# Patient Record
Sex: FEMALE
[De-identification: names, ages, dates, MRNs, and addresses within clinical notes are randomized per-mention and may not be internally consistent; named-entity substitution may affect disease eponyms.]

---

## 2022-01-26 ENCOUNTER — NON-APPOINTMENT (OUTPATIENT)
Age: 82
End: 2022-01-26

## 2022-01-26 ENCOUNTER — APPOINTMENT (OUTPATIENT)
Dept: INTERNAL MEDICINE | Facility: CLINIC | Age: 82
End: 2022-01-26
Payer: MEDICARE

## 2022-01-26 ENCOUNTER — CLINICAL ADVICE (OUTPATIENT)
Age: 82
End: 2022-01-26

## 2022-01-26 VITALS
OXYGEN SATURATION: 96 % | WEIGHT: 112 LBS | DIASTOLIC BLOOD PRESSURE: 80 MMHG | BODY MASS INDEX: 25.92 KG/M2 | HEART RATE: 62 BPM | HEIGHT: 55 IN | SYSTOLIC BLOOD PRESSURE: 144 MMHG

## 2022-01-26 DIAGNOSIS — Z78.9 OTHER SPECIFIED HEALTH STATUS: ICD-10-CM

## 2022-01-26 LAB
BILIRUB UR QL STRIP: NEGATIVE
CLARITY UR: CLEAR
COLLECTION METHOD: NORMAL
GLUCOSE UR-MCNC: NEGATIVE
HCG UR QL: 0.2 EU/DL
HGB UR QL STRIP.AUTO: NORMAL
KETONES UR-MCNC: NEGATIVE
LEUKOCYTE ESTERASE UR QL STRIP: NEGATIVE
NITRITE UR QL STRIP: NEGATIVE
PH UR STRIP: 5.5
PROT UR STRIP-MCNC: NEGATIVE
SP GR UR STRIP: 1.02

## 2022-01-26 PROCEDURE — 81003 URINALYSIS AUTO W/O SCOPE: CPT | Mod: QW

## 2022-01-26 PROCEDURE — 36415 COLL VENOUS BLD VENIPUNCTURE: CPT

## 2022-01-26 PROCEDURE — 93000 ELECTROCARDIOGRAM COMPLETE: CPT

## 2022-01-26 PROCEDURE — G0439: CPT

## 2022-01-26 NOTE — ASSESSMENT
[FreeTextEntry1] : 82 year old female presents for initial annual exam.\par \par Vascular dementia\par -Fall precautions\par -Aspiration precautions\par -Continue meds as directed\par -Neuro consult\par -Advised to touch base with elderly \par \par h/o CVA\par -cont aspirin, statin \par \par Abnormal EKG, no chest pain.  likely nstemi in past?\par -cont aspirin, statin\par -cardio consult \par \par Annual \par -Advise to get yearly Flu shot -up to date \par -Advise Yearly Eye exam with Ophthalmologist\par -Advise Yearly Dental exam\par -Educated of the importance of Healthy diet, such as Mediterranean Diet and Exercise, such as walking >20 minutes a day and increasing gradually as tolerated\par \par f/u in 3 months \par \par \par \par \par \par

## 2022-01-26 NOTE — HEALTH RISK ASSESSMENT
[Good] : ~his/her~  mood as  good [Never] : Never [No] : No [0] : 2) Feeling down, depressed, or hopeless: Not at all (0) [] :  [Independent] : feeding [Some assistance needed] : walking [[id number]] : 0 [Change in mental status noted] : No change in mental status noted

## 2022-01-26 NOTE — PHYSICAL EXAM
[No Acute Distress] : no acute distress R/O Malignancy [Normal] : normal rate, regular rhythm, normal S1 and S2 and no murmur heard [Pedal Pulses Present] : the pedal pulses are present [No Edema] : there was no peripheral edema [Soft] : abdomen soft [Non Tender] : non-tender [Non-distended] : non-distended [Normal Posterior Cervical Nodes] : no posterior cervical lymphadenopathy [Normal Anterior Cervical Nodes] : no anterior cervical lymphadenopathy [No CVA Tenderness] : no CVA  tenderness [No Rash] : no rash [Normal Affect] : the affect was normal [de-identified] : moderate cognitive impairment

## 2022-01-26 NOTE — HISTORY OF PRESENT ILLNESS
[de-identified] : 82 year old female presents for initial annual exam.  Accompanied by daughter who is also acting as \par Recently moved to [place] and is living with daughter, previously was living in [place] for the last 50 years\par \par h/o CVA, unsteady gait, vascular dementia, \par has been slowly declining over the last several years.  Occasionally gets agitated but mostly pleasant\par Normal p.o. intake\par no home care established.  \par \par Answer simple commands, questions \par \par \par \par \par \par \par \par \par \par \par \par \par

## 2022-01-27 ENCOUNTER — CLINICAL ADVICE (OUTPATIENT)
Age: 82
End: 2022-01-27

## 2022-01-27 LAB
25(OH)D3 SERPL-MCNC: 42.3 NG/ML
ALBUMIN SERPL ELPH-MCNC: 4.3 G/DL
ALP BLD-CCNC: 108 U/L
ALT SERPL-CCNC: 19 U/L
ANION GAP SERPL CALC-SCNC: 13 MMOL/L
APPEARANCE: CLEAR
AST SERPL-CCNC: 23 U/L
BACTERIA: NEGATIVE
BASOPHILS # BLD AUTO: 0.01 K/UL
BASOPHILS NFR BLD AUTO: 0.1 %
BILIRUB DIRECT SERPL-MCNC: 0.2 MG/DL
BILIRUB INDIRECT SERPL-MCNC: 0.4 MG/DL
BILIRUB SERPL-MCNC: 0.5 MG/DL
BILIRUBIN URINE: NEGATIVE
BLOOD URINE: NEGATIVE
BUN SERPL-MCNC: 21 MG/DL
CALCIUM SERPL-MCNC: 9 MG/DL
CHLORIDE SERPL-SCNC: 106 MMOL/L
CHOLEST SERPL-MCNC: 135 MG/DL
CO2 SERPL-SCNC: 26 MMOL/L
COLOR: NORMAL
CREAT SERPL-MCNC: 0.71 MG/DL
EOSINOPHIL # BLD AUTO: 0.04 K/UL
EOSINOPHIL NFR BLD AUTO: 0.5 %
ESTIMATED AVERAGE GLUCOSE: 103 MG/DL
FERRITIN SERPL-MCNC: 82 NG/ML
FOLATE SERPL-MCNC: 7.5 NG/ML
GLUCOSE QUALITATIVE U: NEGATIVE
GLUCOSE SERPL-MCNC: 97 MG/DL
HBA1C MFR BLD HPLC: 5.2 %
HCT VFR BLD CALC: 41.5 %
HDLC SERPL-MCNC: 50 MG/DL
HGB BLD-MCNC: 13.1 G/DL
HYALINE CASTS: 4 /LPF
IMM GRANULOCYTES NFR BLD AUTO: 0.2 %
IRON SATN MFR SERPL: 37 %
IRON SERPL-MCNC: 88 UG/DL
KETONES URINE: NEGATIVE
LDLC SERPL CALC-MCNC: 33 MG/DL
LEUKOCYTE ESTERASE URINE: NEGATIVE
LYMPHOCYTES # BLD AUTO: 2.11 K/UL
LYMPHOCYTES NFR BLD AUTO: 25.2 %
MAN DIFF?: NORMAL
MCHC RBC-ENTMCNC: 31 PG
MCHC RBC-ENTMCNC: 31.6 GM/DL
MCV RBC AUTO: 98.3 FL
MICROSCOPIC-UA: NORMAL
MONOCYTES # BLD AUTO: 0.53 K/UL
MONOCYTES NFR BLD AUTO: 6.3 %
NEUTROPHILS # BLD AUTO: 5.67 K/UL
NEUTROPHILS NFR BLD AUTO: 67.7 %
NITRITE URINE: NEGATIVE
NONHDLC SERPL-MCNC: 85 MG/DL
PH URINE: 6
PLATELET # BLD AUTO: 199 K/UL
POTASSIUM SERPL-SCNC: 4.1 MMOL/L
PROT SERPL-MCNC: 6.6 G/DL
PROTEIN URINE: NORMAL
RBC # BLD: 4.22 M/UL
RBC # FLD: 13.3 %
RED BLOOD CELLS URINE: 2 /HPF
SODIUM SERPL-SCNC: 145 MMOL/L
SPECIFIC GRAVITY URINE: 1.03
SQUAMOUS EPITHELIAL CELLS: 4 /HPF
T3FREE SERPL-MCNC: 2.39 PG/ML
T4 FREE SERPL-MCNC: 1.7 NG/DL
TIBC SERPL-MCNC: 241 UG/DL
TRIGL SERPL-MCNC: 261 MG/DL
TSH SERPL-ACNC: 0.2 UIU/ML
UIBC SERPL-MCNC: 152 UG/DL
UROBILINOGEN URINE: NORMAL
VIT B12 SERPL-MCNC: 977 PG/ML
WBC # FLD AUTO: 8.38 K/UL
WHITE BLOOD CELLS URINE: 4 /HPF

## 2022-03-09 DIAGNOSIS — Z82.3 FAMILY HISTORY OF STROKE: ICD-10-CM

## 2022-03-09 RX ORDER — DRONABINOL 2.5 MG/1
2.5 CAPSULE ORAL DAILY
Refills: 0 | Status: ACTIVE | COMMUNITY

## 2022-03-16 ENCOUNTER — APPOINTMENT (OUTPATIENT)
Dept: CARDIOLOGY | Facility: CLINIC | Age: 82
End: 2022-03-16
Payer: MEDICARE

## 2022-03-16 VITALS
DIASTOLIC BLOOD PRESSURE: 97 MMHG | SYSTOLIC BLOOD PRESSURE: 165 MMHG | WEIGHT: 112 LBS | HEART RATE: 61 BPM | HEIGHT: 57 IN | TEMPERATURE: 97.3 F | BODY MASS INDEX: 24.16 KG/M2 | OXYGEN SATURATION: 96 %

## 2022-03-16 PROCEDURE — 99204 OFFICE O/P NEW MOD 45 MIN: CPT

## 2022-03-16 PROCEDURE — 93000 ELECTROCARDIOGRAM COMPLETE: CPT

## 2022-03-29 ENCOUNTER — APPOINTMENT (OUTPATIENT)
Dept: NEUROLOGY | Facility: CLINIC | Age: 82
End: 2022-03-29
Payer: MEDICARE

## 2022-03-29 VITALS — BODY MASS INDEX: 24.81 KG/M2 | WEIGHT: 115 LBS | HEIGHT: 57 IN

## 2022-03-29 VITALS
WEIGHT: 115 LBS | HEIGHT: 57 IN | BODY MASS INDEX: 24.81 KG/M2 | SYSTOLIC BLOOD PRESSURE: 163 MMHG | HEART RATE: 61 BPM | DIASTOLIC BLOOD PRESSURE: 81 MMHG

## 2022-03-29 PROCEDURE — 99205 OFFICE O/P NEW HI 60 MIN: CPT

## 2022-03-29 NOTE — HISTORY OF PRESENT ILLNESS
[FreeTextEntry1] : HPI (initial visit Mar 29, 2022)- 82 year old female w/ hx of CVA, HTN, HLD, diagnosed with vascular dementia, is referred to neurology to establish neurological care. \par \par She recently moved to [place], from [place] (where she lived for 50 years), to live with daughter. \par \par She speaks Turkmen, and is accompanied by  and daughter. Daughter helps with translation.\par \par Daughter reports she had a stroke in 2019 (left sided weakness, slurred speech) and she went to rehab. No memory concerns prior to stroke. There was a gradual decline in memory after stroke, most noticeable in early 2020. She woke up one morning and was confused, did not know where she was. There were days she was fine. "I think she has gotten through several step declines".She has had a jumpy decline, now stable. She recently started having some behavioral disturbances, started a few months ago and was a 2 day incident, she was seeing and hearing things and wanted to leave the house. Per , "per  is gone". She recognizes faces and close family and friends. She no longer converses much, but when she does, it does not make any sense.  adds that she talks to people in the night times. "she sleeps all day". She is able to feed herself, but needs assistance with dressing/bathing. \par \par SHe is on both donepezil 10 mg QD and memantine 10 mg BID- no necessary improvement noticed on these medications or worsening confusion. She is also on aspirin therapy. She is not on anticoagulation. No hx of bradycardia. \par \par Daughter adds that she has gotten slower since her stroke and shuffles more, however she believes she may have shuffled in the past as well.  also notices a mild tremor of hands.\par NO known dementia in family. Sister has hx of strokes and aneurysm. \par \par Labs reviewed from 1/2022:-\par B12 and folate normal\par Ferritin normal. LFT normal. Iron and TBC normal. \par TSH 0.20 [L], Hba1c normal. Vitamin D normal (42.3)

## 2022-03-29 NOTE — PHYSICAL EXAM
[FreeTextEntry1] : PHYSICAL EXAM\par Constitutional: Alert, no acute distress \par Neck: Full range of motion\par Psychiatric: appropriate affect and mood\par Pulmonary: No respiratory distress, stable on room air\par \par NEUROLOGICAL EXAM\par Mental status: Oriented to name, country, state, and month. \par                         MMSE: 10/30 (oriented to country, state, month, word registration intact, naming intact, repetition intact, can follow 2 step commands, follows written commands)\par Speech/language: No dysarthria. Intact naming of objects (pen, watch). Comprehension intact. \par Cranial nerves:\par CN II:  Pupil size equal and briskly reactive to light. \par CN III, IV, VI: EOMI, no nystagmus, no ptosis. \par CN V: Facial sensation is intact to pinprick in all 3 divisions bilaterally.\par CN VII: Face is symmetric with normal eye closure and smile.\par CN VII: Hearing is normal to rubbing fingers\par CN IX, X: Palate elevates symmetrically. Phonation is normal.\par CN XI: Head turning and shoulder shrug are intact\par CN XII: Tongue is midline with normal movements and no atrophy.\par Motor: There is no pronator drift of out-stretched arms. + cogwheel rigidity at right wrist. Proximal strength UE and LE 4+/5 and distally 5/5. \par 5/5 muscle power at bilat: Deltoid, Biceps, Triceps, Wrist ext, Finger abd, Hip flex, Hip ext, Knee flex, Knee ext, Ankle flex, Ankle ext\par Reflexes: Reflexes are 2+ and symmetric at the biceps and knees. Plantar responses are flexor.\par Sensory: Intact sensations to light touch in upper and lower extremities\par Coordination: Mild action tremor (R>L). No bradykinesia.\par Gait/Stance: Stooped posture. Shuffling gait with decreased arm swing and turn en bloc.\par \par \par

## 2022-03-29 NOTE — ASSESSMENT
[FreeTextEntry1] : Assessment/Plan:\par  82 year old right handed Mongolian speaking female w/ hx of cognitive decline since her stroke in 2019. She has had a steady stepwise decline in her memory and lately has had some mild behavioral disturbances. She was given the diagnosis of vascular dementia at [place] and is on donepezil and memantine, and it is unclear if she has had any clinical improvement on these therapies. On neuro exam, she exhibits some parkinsonian features and scored a 10/30 on MMSE. 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 However, cannot completely rule out a mixed dementia presentation (vascular + neurodegenerative, i.e, Alzheimers vs Lewy Body dementia)\par \par Plan:-\par [] Requested family obtain records from [place] (particular neurology records including imaging)\par [] Will continue on donepezil and memantine for now. Explain to family that these two therapies are indicated for Alzheimers dementia and do not necessarily have any role in vascular dementia. Will review records from [place] and could consider tapering off at future visit. \par [] Counselled on importance of management of HTN and HLD and compliance with aspirin therapy, to prevent continued progression of vascular dementia. \par \par Return to clinic 3 months\par \par Tati Lombardi M.D\par

## 2022-07-05 ENCOUNTER — NON-APPOINTMENT (OUTPATIENT)
Age: 82
End: 2022-07-05

## 2022-07-07 LAB
APPEARANCE: CLEAR
BILIRUBIN URINE: NEGATIVE
BLOOD URINE: NEGATIVE
COLOR: NORMAL
GLUCOSE QUALITATIVE U: NEGATIVE
KETONES URINE: NEGATIVE
LEUKOCYTE ESTERASE URINE: NEGATIVE
NITRITE URINE: NEGATIVE
PH URINE: 6
PROTEIN URINE: NORMAL
SPECIFIC GRAVITY URINE: 1.02
UROBILINOGEN URINE: NORMAL

## 2022-07-12 ENCOUNTER — APPOINTMENT (OUTPATIENT)
Dept: INTERNAL MEDICINE | Facility: CLINIC | Age: 82
End: 2022-07-12

## 2022-07-12 VITALS
SYSTOLIC BLOOD PRESSURE: 173 MMHG | OXYGEN SATURATION: 95 % | BODY MASS INDEX: 25.03 KG/M2 | HEART RATE: 60 BPM | DIASTOLIC BLOOD PRESSURE: 83 MMHG | HEIGHT: 57 IN | WEIGHT: 116 LBS

## 2022-07-12 PROCEDURE — 99214 OFFICE O/P EST MOD 30 MIN: CPT | Mod: 25

## 2022-07-12 PROCEDURE — 36415 COLL VENOUS BLD VENIPUNCTURE: CPT

## 2022-07-12 NOTE — PHYSICAL EXAM
[No Acute Distress] : no acute distress [Normal] : normal rate, regular rhythm, normal S1 and S2 and no murmur heard [Pedal Pulses Present] : the pedal pulses are present [No Edema] : there was no peripheral edema [Soft] : abdomen soft [Non Tender] : non-tender [Non-distended] : non-distended [de-identified] : moderate cognitive impairment.  follows simple commands

## 2022-07-12 NOTE — HISTORY OF PRESENT ILLNESS
[de-identified] : 82 year old female h/o CVA, unsteady gait, vascular dementia,  presents for follow-up.  \par Recently moved to [place] and is living with daughter, previously was living in [place] for the last 50 years\par She speaks Arabic, and is accompanied by  and daughter. Daughter helps with translation.\par \par Recently seen by neurologist regarding her vascular dementia, agitation started on Seroquel by neurologist\par has been slowly declining over the last several years. \par Normal p.o. intake\par no home care established.  Daughter states waiting for her house to be reevaluated as currently living in an apartment.  In the process of getting Medicaid\par \par Answer simple commands, questions

## 2022-07-12 NOTE — ASSESSMENT
[FreeTextEntry1] : 82 year old female h/o CVA, unsteady gait, vascular dementia,  presents for follow-up.  \par \par Vascular dementia with some behavioral disturbance \par -seroquel qhs and prn\par -Fall precautions\par -Aspiration precautions\par -Continue articept, namenda as directed\par -Neuro consult appreciated, \par \par h/o CVA\par -cont aspirin, statin \par \par Abnormal EKG, no chest pain.  likely nstemi in past?\par -cont aspirin, statin\par -cardio consult appreciated, no further intervention \par \par \par f/u in 3 months \par \par Please note that 36 minutes time spent in care with this patient which includes pre-visit preparation such as reviewed pertinent labs, imaging, and specialists consultation, in-person visit, post-visit documentation and discussion.\par \par \par \par \par \par \par

## 2022-07-13 LAB
ALBUMIN SERPL ELPH-MCNC: 4.2 G/DL
ALP BLD-CCNC: 128 U/L
ALT SERPL-CCNC: 17 U/L
ANION GAP SERPL CALC-SCNC: 11 MMOL/L
AST SERPL-CCNC: 20 U/L
BASOPHILS # BLD AUTO: 0.01 K/UL
BASOPHILS NFR BLD AUTO: 0.1 %
BILIRUB DIRECT SERPL-MCNC: 0.1 MG/DL
BILIRUB INDIRECT SERPL-MCNC: 0.3 MG/DL
BILIRUB SERPL-MCNC: 0.4 MG/DL
BUN SERPL-MCNC: 14 MG/DL
CALCIUM SERPL-MCNC: 9 MG/DL
CHLORIDE SERPL-SCNC: 108 MMOL/L
CHOLEST SERPL-MCNC: 135 MG/DL
CO2 SERPL-SCNC: 27 MMOL/L
CREAT SERPL-MCNC: 0.7 MG/DL
EGFR: 86 ML/MIN/1.73M2
EOSINOPHIL # BLD AUTO: 0.11 K/UL
EOSINOPHIL NFR BLD AUTO: 1.3 %
ESTIMATED AVERAGE GLUCOSE: 105 MG/DL
GLUCOSE SERPL-MCNC: 106 MG/DL
HBA1C MFR BLD HPLC: 5.3 %
HCT VFR BLD CALC: 39.4 %
HDLC SERPL-MCNC: 55 MG/DL
HGB BLD-MCNC: 13.1 G/DL
IMM GRANULOCYTES NFR BLD AUTO: 0.1 %
LDLC SERPL CALC-MCNC: 27 MG/DL
LYMPHOCYTES # BLD AUTO: 1.93 K/UL
LYMPHOCYTES NFR BLD AUTO: 22.4 %
MAN DIFF?: NORMAL
MCHC RBC-ENTMCNC: 31.8 PG
MCHC RBC-ENTMCNC: 33.2 GM/DL
MCV RBC AUTO: 95.6 FL
MONOCYTES # BLD AUTO: 0.49 K/UL
MONOCYTES NFR BLD AUTO: 5.7 %
NEUTROPHILS # BLD AUTO: 6.07 K/UL
NEUTROPHILS NFR BLD AUTO: 70.4 %
NONHDLC SERPL-MCNC: 80 MG/DL
PLATELET # BLD AUTO: 199 K/UL
POTASSIUM SERPL-SCNC: 3.9 MMOL/L
PROT SERPL-MCNC: 6.2 G/DL
RBC # BLD: 4.12 M/UL
RBC # FLD: 13 %
SODIUM SERPL-SCNC: 146 MMOL/L
T3FREE SERPL-MCNC: 2.67 PG/ML
T4 FREE SERPL-MCNC: 1.7 NG/DL
TRIGL SERPL-MCNC: 264 MG/DL
TSH SERPL-ACNC: 0.05 UIU/ML
WBC # FLD AUTO: 8.62 K/UL

## 2022-09-21 ENCOUNTER — APPOINTMENT (OUTPATIENT)
Dept: CARDIOLOGY | Facility: CLINIC | Age: 82
End: 2022-09-21

## 2022-09-26 ENCOUNTER — NON-APPOINTMENT (OUTPATIENT)
Age: 82
End: 2022-09-26

## 2022-10-10 ENCOUNTER — APPOINTMENT (OUTPATIENT)
Dept: NEUROLOGY | Facility: CLINIC | Age: 82
End: 2022-10-10

## 2022-10-10 VITALS
SYSTOLIC BLOOD PRESSURE: 173 MMHG | WEIGHT: 111 LBS | HEIGHT: 57 IN | BODY MASS INDEX: 23.95 KG/M2 | DIASTOLIC BLOOD PRESSURE: 89 MMHG | HEART RATE: 65 BPM

## 2022-10-10 PROCEDURE — 99214 OFFICE O/P EST MOD 30 MIN: CPT

## 2022-10-10 RX ORDER — SERTRALINE 25 MG/1
TABLET, FILM COATED ORAL
Refills: 0 | Status: ACTIVE | COMMUNITY

## 2022-10-10 RX ORDER — QUETIAPINE FUMARATE 25 MG/1
25 TABLET ORAL
Qty: 30 | Refills: 0 | Status: DISCONTINUED | COMMUNITY
Start: 2022-07-05 | End: 2022-10-10

## 2022-10-10 NOTE — HISTORY OF PRESENT ILLNESS
[FreeTextEntry1] : INTERIM HX 10/10/2022: Family brought in CTA head 3/2021 disc and report. She is accompanied by daughter and . She has been refusing to take meds. Keeps pills in her mouth. Walking is getting worse, posture worse. Sleeps a lot more. Talks to herself in the night. More bladder accidents, wearing depends.  Still recognizes family, can have a blank stare at times. Occasional says a few words, or respond to questions. She said, "Hi" to me when I walked in, daughter was surprised about this. This is inconsistent. Speech is mostly nonsensical . She cannot dress herself. Needs assistance with feeding. No falls. Used to have PT 2x/week. She has had constipation since she was younger. She has yelled out in her sleep, even before her stroke. \par ----------------------------------------------\par HPI (initial visit Mar 29, 2022)- 82 year old right sided female w/ hx of CVA, HTN, HLD, diagnosed with vascular dementia, is referred to neurology to establish neurological care. \par \par She recently moved to [place], from [place] (where she lived for 50 years), to live with daughter Summer). \par \par She speaks Albanian, and is accompanied by  and daughter. Daughter helps with translation.\par \par Daughter reports she had a stroke in 2017 (left sided weakness, slurred speech) and she went to rehab. No memory concerns prior to stroke. There was a gradual decline in memory after stroke, most noticeable in early 2020. She woke up one morning and was confused, did not know where she was. There were days she was fine. "I think she has gotten through several step declines".She has had a jumpy decline, now stable. She recently started having some behavioral disturbances, started a few months ago and was a 2 day incident, she was seeing and hearing things and wanted to leave the house. Per , "per  is gone". She recognizes faces and close family and friends. She no longer converses much, but when she does, it does not make any sense.  adds that she talks to people in the night times. "she sleeps all day". She is able to feed herself, but needs assistance with dressing/bathing. \par \par SHe is on both donepezil 10 mg QD and memantine 10 mg BID- no necessary improvement noticed on these medications or worsening confusion. She is also on aspirin therapy. She is not on anticoagulation. No hx of bradycardia. \par \par Daughter adds that she has gotten slower since her stroke and shuffles more, however she believes she may have shuffled in the past as well.  also notices a mild tremor of hands.\par NO known dementia in family. Sister has hx of strokes and aneurysm. \par \par Labs reviewed from 1/2022:-\par B12 and folate normal\par Ferritin normal. LFT normal. Iron and TBC normal. \par TSH 0.20 [L], Hba1c normal. Vitamin D normal (42.3)

## 2022-10-10 NOTE — ASSESSMENT
[FreeTextEntry1] : Assessment/Plan:\par  82 year old right handed Maori speaking female w/ hx of cognitive decline since her stroke in 2017. She has had a steady stepwise decline in her memory and lately has had some mild behavioral and gait disturbances. She was given the diagnosis of vascular dementia at [place] and is on donepezil and memantine, and it is unclear if she has had any clinical improvement on these therapies. On neuro exam, she exhibits parkinsonian features and scored a 10/30 on MMSE (3/2022). 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 At the same time, cannot completely rule out a mixed dementia presentation (vascular + neurodegenerative, i.e, Alzheimers vs Lewy Body dementia)\par \par Dementia stage is at least moderate. \par \par Plan:-\par [] Requested family obtain records from [place] (particular neurology records including imaging)- MRI brain\par [] CTA head to follow up on Aneurysm\par [] Will refer to movement disorder for parkinsonism and to see if there is any indication to trial levodopa\par [] Will continue on donepezil and memantine for now. Explained to family that these two therapies are indicated for Alzheimers dementia and do not necessarily have any role in vascular dementia. Will review records from [place] and could consider tapering off at future visit. \par [] Counselled on importance of management of HTN and HLD and compliance with aspirin therapy, to prevent continued progression of vascular dementia. \par [] Fall precautions\par \par \par Return to clinic 6 months\par \par Tati Lombardi M.D\par

## 2022-10-10 NOTE — DATA REVIEWED
[de-identified] : CTA head 3/23/2021 completed in [place]- "high grade stenosis of the proximal right MCA without occlusion. 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 Saccular right M2 MCA aneurysm, measuring up to 4 cm. No evidence of acute intracranial pathology". "Moderate supratentorial parenchymal volume loss and associated ventriculomegaly". "moderate chronic microvascular ischemic changes"

## 2022-10-10 NOTE — PHYSICAL EXAM
[FreeTextEntry1] : (she is Italian speaking, can understand a little English)\par Oriented to name, country (not state).  Not date/month/year. \par she recognizes family and named them\par + Myerson sign and palmomental sign\par Immediate recall 2/3 \par Delayed recall 0/3\par No dysarthria. Intact naming of objects (pen, necklace). Able to follow simple commands and 3 step commands. Intact repetition in Italian. \par Pupil size equal and briskly reactive to light. \par EOMI, no nystagmus, no ptosis. \par Facial sensation is intact to pinprick in all 3 divisions bilaterally.\par There is no pronator drift of out-stretched arms. + cogwheel rigidity at right wrist. Proximal strength UE and LE 4+/5 and distally 5/5. \par Reflexes are 2+ and symmetric at the biceps and knees. Plantar responses are flexor.\par Intact sensations to light touch in upper and lower extremities\par Mild action tremor (R>L). No bradykinesia. Cogwheel rigidity at wrist\par Stooped posture. Shuffling gait with decreased arm swing and turn en bloc.\par \par \par

## 2022-10-17 ENCOUNTER — APPOINTMENT (OUTPATIENT)
Dept: CARDIOLOGY | Facility: CLINIC | Age: 82
End: 2022-10-17

## 2022-10-17 VITALS
HEART RATE: 59 BPM | SYSTOLIC BLOOD PRESSURE: 134 MMHG | DIASTOLIC BLOOD PRESSURE: 77 MMHG | HEIGHT: 57 IN | TEMPERATURE: 96.8 F | BODY MASS INDEX: 23.95 KG/M2 | WEIGHT: 111 LBS | OXYGEN SATURATION: 96 %

## 2022-10-17 DIAGNOSIS — R94.31 ABNORMAL ELECTROCARDIOGRAM [ECG] [EKG]: ICD-10-CM

## 2022-10-17 DIAGNOSIS — Z13.6 ENCOUNTER FOR SCREENING FOR CARDIOVASCULAR DISORDERS: ICD-10-CM

## 2022-10-17 PROCEDURE — 99214 OFFICE O/P EST MOD 30 MIN: CPT

## 2022-10-21 PROBLEM — Z13.6 SCREENING FOR CARDIOVASCULAR CONDITION: Status: ACTIVE | Noted: 2022-03-20

## 2022-10-21 PROBLEM — R94.31 ABNORMAL EKG: Status: ACTIVE | Noted: 2022-01-26

## 2022-10-27 ENCOUNTER — APPOINTMENT (OUTPATIENT)
Dept: CT IMAGING | Facility: CLINIC | Age: 82
End: 2022-10-27

## 2022-10-27 ENCOUNTER — OUTPATIENT (OUTPATIENT)
Dept: OUTPATIENT SERVICES | Facility: HOSPITAL | Age: 82
LOS: 1 days | End: 2022-10-27
Payer: MEDICARE

## 2022-10-27 DIAGNOSIS — I67.1 CEREBRAL ANEURYSM, NONRUPTURED: ICD-10-CM

## 2022-10-27 PROCEDURE — 70496 CT ANGIOGRAPHY HEAD: CPT | Mod: 26,MH

## 2022-10-27 PROCEDURE — 70496 CT ANGIOGRAPHY HEAD: CPT

## 2022-10-31 ENCOUNTER — NON-APPOINTMENT (OUTPATIENT)
Age: 82
End: 2022-10-31

## 2022-11-10 ENCOUNTER — RX RENEWAL (OUTPATIENT)
Age: 82
End: 2022-11-10

## 2022-11-15 ENCOUNTER — APPOINTMENT (OUTPATIENT)
Dept: NEUROLOGY | Facility: CLINIC | Age: 82
End: 2022-11-15

## 2022-11-15 VITALS — SYSTOLIC BLOOD PRESSURE: 153 MMHG | HEART RATE: 68 BPM | DIASTOLIC BLOOD PRESSURE: 87 MMHG

## 2022-11-15 PROCEDURE — 99215 OFFICE O/P EST HI 40 MIN: CPT

## 2022-11-15 NOTE — REVIEW OF SYSTEMS
[Confused or Disoriented] : confusion [Decr. Concentrating Ability] : decreased concentrating ability [Difficulties in Speech] : speech difficulties [Difficulty Walking] : difficulty walking [Joint Pain] : joint pain [Fever] : no fever [Chills] : no chills [Feeling Poorly] : not feeling poorly [Feeling Tired] : not feeling tired [Memory Lapses or Loss] : no memory loss [Difficulty with Language] : no ~M difficulty with language [Changed Thought Patterns] : no change in thought patterns [Repeating Questions] : no repeated questioning about recent events [Facial Weakness] : no facial weakness [Arm Weakness] : no arm weakness [Hand Weakness] : no hand weakness [Leg Weakness] : no leg weakness [Poor Coordination] : good coordination [Difficulty Writing] : no difficulty writing [Numbness] : no numbness [Seizures] : no convulsions [Lightheadedness] : no lightheadedness [Tension Headache] : no tension-type headache [Anxiety] : no anxiety [Depression] : no depression [Eyesight Problems] : no eyesight problems [Loss Of Hearing] : no hearing loss [Chest Pain] : no chest pain [Wheezing] : no wheezing [Vomiting] : no vomiting [Easy Bruising] : no tendency for easy bruising

## 2022-11-15 NOTE — DISCUSSION/SUMMARY
[FreeTextEntry1] : Ms. Atkins is an 82 year old woman with a PMHx  of HTN, HLD, advanced vascular dementia referred by Dr. 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 We discussed based on patient's age and overall health there is no indication for aneurysm treatment as risk outweigh any benefits. She also has significant right MCA stenosis. Patient's daughter in agreement with conservative management, no need for further imaging. She will continue to follow with Dr. Lombardi for her dementia. All of their questions and concerns were addressed,

## 2022-11-15 NOTE — HISTORY OF PRESENT ILLNESS
[FreeTextEntry1] : Ms. Atkins is an 82 year old woman with a PMHx  of HTN, HLD,  advanced vascular dementia (diagnosed in 2020) referred by Dr. Lombardi for incidental aneurysms, first found in 2017. MRA Head from 2017 and CTA Head from 2022 reviewed personally by me. CTA head showed showed a right supraclinoid internal carotid artery aneurysm and a right MCA bifurcation aneurysm.

## 2022-11-15 NOTE — PHYSICAL EXAM
[General Appearance - Alert] : alert [Mood] : the mood was normal [Person] : oriented to person [Place] : oriented to place [Time] : oriented to time [Concentration Intact] : normal concentrating ability [Visual Intact] : visual attention was ~T not ~L decreased [Repeating Phrases] : no difficulty repeating a phrase [Cranial Nerves Optic (II)] : visual acuity intact bilaterally,  visual fields full to confrontation, pupils equal round and reactive to light [Cranial Nerves Oculomotor (III)] : extraocular motion intact [Cranial Nerves Trigeminal (V)] : facial sensation intact symmetrically [Cranial Nerves Facial (VII)] : face symmetrical [Cranial Nerves Vestibulocochlear (VIII)] : hearing was intact bilaterally [Cranial Nerves Glossopharyngeal (IX)] : tongue and palate midline [Cranial Nerves Accessory (XI - Cranial And Spinal)] : head turning and shoulder shrug symmetric [Cranial Nerves Hypoglossal (XII)] : there was no tongue deviation with protrusion [Motor Tone] : muscle tone was normal in all four extremities [Motor Strength] : muscle strength was normal in all four extremities [No Muscle Atrophy] : normal bulk in all four extremities [Motor Handedness Right-Handed] : the patient is right hand dominant [Sensation Tactile Decrease] : light touch was intact [Full Visual Field] : full visual field [Hearing Threshold Finger Rub Not Bradley] : hearing was normal [Neck Appearance] : the appearance of the neck was normal [] : no respiratory distress [Heart Rate And Rhythm] : heart rate was normal and rhythm regular [Abdomen Soft] : soft [Skin Color & Pigmentation] : normal skin color and pigmentation [Short Term Intact] : short term memory impaired [Naming Objects] : difficulty naming common objects [Writing A Sentence] : difficulty writing a sentence [Fluency] : fluency not intact [Comprehension] : comprehension not intact [Reading] : difficulty reading [Past History] : inadequate knowledge of personal past history

## 2023-01-12 ENCOUNTER — APPOINTMENT (OUTPATIENT)
Dept: INTERNAL MEDICINE | Facility: CLINIC | Age: 83
End: 2023-01-12
Payer: MEDICARE

## 2023-01-12 DIAGNOSIS — U07.1 COVID-19: ICD-10-CM

## 2023-01-12 PROCEDURE — 99442: CPT | Mod: CS,95

## 2023-01-12 RX ORDER — DONEPEZIL HYDROCHLORIDE 10 MG/1
10 TABLET ORAL
Qty: 90 | Refills: 0 | Status: DISCONTINUED | COMMUNITY
End: 2023-01-12

## 2023-01-30 ENCOUNTER — APPOINTMENT (OUTPATIENT)
Dept: INTERNAL MEDICINE | Facility: CLINIC | Age: 83
End: 2023-01-30
Payer: MEDICARE

## 2023-01-30 ENCOUNTER — RX RENEWAL (OUTPATIENT)
Age: 83
End: 2023-01-30

## 2023-01-30 VITALS
SYSTOLIC BLOOD PRESSURE: 130 MMHG | DIASTOLIC BLOOD PRESSURE: 83 MMHG | HEART RATE: 66 BPM | HEIGHT: 57 IN | BODY MASS INDEX: 23.3 KG/M2 | WEIGHT: 108 LBS

## 2023-01-30 PROCEDURE — 99214 OFFICE O/P EST MOD 30 MIN: CPT

## 2023-01-30 RX ORDER — NIRMATRELVIR AND RITONAVIR 300-100 MG
20 X 150 MG & KIT ORAL
Qty: 30 | Refills: 0 | Status: DISCONTINUED | COMMUNITY
Start: 2023-01-12 | End: 2023-01-30

## 2023-01-30 NOTE — PHYSICAL EXAM
[de-identified] : Both knees appear symmetrical.  Some excess skin medially bilaterally.  No tenderness to palpation or discoloration.  pasvvie ROM with fo pain

## 2023-01-30 NOTE — HISTORY OF PRESENT ILLNESS
[de-identified] : 82 year old female h/o CVA, unsteady gait, vascular dementia,  presents for follow-up.  Presents with daughter\par \par States he has noticed some left knee swelling medially.  No tenderness to touch.  No redness.  no more  difficulty walking compared to baseline \par \par \par \par \par \par HPI:\par Recently moved to [place] and is living with daughter, previously was living in [place] for the last 50 years\par She speaks German, and is accompanied by  and daughter. Daughter helps with translation.\par Recently seen by neurologist regarding her vascular dementia, agitation started on Seroquel by neurologist\par has been slowly declining over the last several years. \par Normal p.o. intake\par no home care established.  Daughter states waiting for her house to be reevaluated as currently living in an apartment.  In the process of getting Medicaid\par Answer simple commands, questions

## 2023-01-30 NOTE — ASSESSMENT
[FreeTextEntry1] : 83 year old female h/o CVA, unsteady gait, vascular dementia,  presents for follow-up.  \par \par left knee swelling, normal anatomy as compared to right \par -reasured \par -monitor for change \par \par Vascular dementia with some behavioral disturbance \par -Fall precautions\par -Aspiration precautions\par -Continue namenda as directed\par -Neuro consult appreciated, \par \par h/o CVA\par -cont aspirin, statin \par \par Abnormal EKG, no chest pain.  likely nstemi in past?\par -cont aspirin, statin\par -cardio consult appreciated, no further intervention \par \par \par f/u for annual exam  \par \par Please note that 33 minutes time spent in care with this patient which includes pre-visit preparation such as reviewed pertinent labs, imaging, and specialists consultation, in-person visit, post-visit documentation and discussion.\par \par \par \par \par \par \par

## 2023-02-16 ENCOUNTER — APPOINTMENT (OUTPATIENT)
Dept: NEUROLOGY | Facility: CLINIC | Age: 83
End: 2023-02-16

## 2023-03-07 RX ORDER — METOPROLOL SUCCINATE 50 MG/1
50 TABLET, EXTENDED RELEASE ORAL DAILY
Refills: 0 | Status: DISCONTINUED | COMMUNITY
End: 2023-03-07

## 2023-03-07 RX ORDER — ASPIRIN 81 MG
81 TABLET, DELAYED RELEASE (ENTERIC COATED) ORAL DAILY
Refills: 0 | Status: ACTIVE | COMMUNITY

## 2023-05-01 ENCOUNTER — APPOINTMENT (OUTPATIENT)
Dept: NEUROLOGY | Facility: CLINIC | Age: 83
End: 2023-05-01
Payer: MEDICARE

## 2023-05-01 VITALS
HEART RATE: 77 BPM | BODY MASS INDEX: 23.3 KG/M2 | HEIGHT: 57 IN | SYSTOLIC BLOOD PRESSURE: 135 MMHG | WEIGHT: 108 LBS | DIASTOLIC BLOOD PRESSURE: 85 MMHG

## 2023-05-01 PROCEDURE — 99214 OFFICE O/P EST MOD 30 MIN: CPT

## 2023-05-01 RX ORDER — LORAZEPAM 0.5 MG/1
0.5 TABLET ORAL DAILY
Refills: 0 | Status: DISCONTINUED | COMMUNITY
End: 2023-05-01

## 2023-05-01 NOTE — ASSESSMENT
[FreeTextEntry1] : Assessment/Plan:\par  83 year old right handed Yakut speaking female w/ hx of cognitive decline since her stroke in 2017. She has had a steady stepwise decline in her memory and now more gait disturbances and mild behavioral symptoms. She was given the diagnosis of vascular dementia at [place]. On neuro exam, she exhibits parkinsonian features and scored a 10/30 on MMSE (3/2022). 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 At the same time, cannot completely rule out a mixed dementia presentation (vascular + neurodegenerative, i.e, Alzheimers vs Lewy Body dementia)\par \par Dementia stage is at least moderate. \par \par Plan:-\par [] Requested family obtain records from [place] (particular neurology records including imaging)- MRI brain\par [] Referred to movement disorder for parkinsonism and to see if there is any indication to trial levodopa- needs new apt. \par [] Pt is no longer on Donepezil. There is no indication for memantine eithe- Recommend tapering off memantine- taper by 5 mg weekly till off. \par [] Counselled on importance of management of HTN and HLD and compliance with aspirin therapy, to prevent continued progression of vascular dementia. \par [] Fall precautions- recommend using a walker. \par \par \par Return to clinic 6 months\par \par Tati Lombardi M.D\par

## 2023-05-01 NOTE — PHYSICAL EXAM
[FreeTextEntry1] : (she is French speaking, can understand a little English)\par \par Oriented to name, "Monique", "hospital", "Monday"(correct) and Month. Not year.\par she recognizes family (daughter and ) and names them\par + Myerson sign and palmomental sign\par Immediate recall 2/3 \par Delayed recall 0/3\par No dysarthria. Intact naming of objects (pen, necklace). Able to follow simple commands. some difficulty with repetition in French (but pauses multiple times)\par Pupil size equal and briskly reactive to light. \par EOMI, no nystagmus, no ptosis. \par No facial asymmetry \par There is no pronator drift of out-stretched arms. + cogwheel rigidity at right wrist. Proximal strength UE and LE 4+/5 and distally 5/5. \par Mild action/postural tremor (R>L). No bradykinesia. Cogwheel rigidity at wrist\par Stooped posture, knees bend. Shuffling gait with decreased arm swing and turn en bloc.\par  unable to assess

## 2023-05-01 NOTE — DATA REVIEWED
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 \par \par CTA head 3/23/2021 completed in [place]- "high grade stenosis of the proximal right MCA without occlusion. 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 Saccular right M2 MCA aneurysm, measuring up to 4 cm. No evidence of acute intracranial pathology". "Moderate supratentorial parenchymal volume loss and associated ventriculomegaly". "moderate chronic microvascular ischemic changes" 
no chest pain, no SOB.

## 2023-05-01 NOTE — HISTORY OF PRESENT ILLNESS
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 \par \par saw Dr. Chan 11/2022- no aneurysm treatment recommended, risk outweigh the benefit. \par movement disorder apt was cancelled in Feb (daughter had work commitments).\par More trouble walking, "mobility worse". Able to feed herself. Not talking much.  "Remembers old stuff, a little bit". Recognizes close family members. Answers "no" to everything. Wearing depends, frequent accidents. No falls. Grabbing onto walls at home, does not use walker. THey have an aid Mon-Friday, 4.5 hrs, helps her with bath. \par She is no longer on Aricept? not sure how long she has been off. A few records from [place] reviewed. \par \par INTERIM HX 10/10/2022: Family brought in CTA head 3/2021 disc and report. She is accompanied by daughter and . She has been refusing to take meds. Keeps pills in her mouth. Walking is getting worse, posture worse. Sleeps a lot more. Talks to herself in the night. More bladder accidents, wearing depends.  Still recognizes family, can have a blank stare at times. Occasional says a few words, or respond to questions. She said, "Hi" to me when I walked in, daughter was surprised about this. This is inconsistent. Speech is mostly nonsensical . She cannot dress herself. Needs assistance with feeding. No falls. Used to have PT 2x/week. She has had constipation since she was younger. She has yelled out in her sleep, even before her stroke. \par ----------------------------------------------\par HPI (initial visit Mar 29, 2022)- 82 year old right sided female w/ hx of CVA, HTN, HLD, diagnosed with vascular dementia, is referred to neurology to establish neurological care. \par \par She recently moved to [place], from [place] (where she lived for 50 years), to live with daughter Summer). \par \par She speaks Kyrgyz, and is accompanied by  and daughter. Daughter helps with translation.\par \par Daughter reports she had a stroke in 2017 (left sided weakness, slurred speech) and she went to rehab. No memory concerns prior to stroke. There was a gradual decline in memory after stroke, most noticeable in early 2020. She woke up one morning and was confused, did not know where she was. There were days she was fine. "I think she has gotten through several step declines".She has had a jumpy decline, now stable. She recently started having some behavioral disturbances, started a few months ago and was a 2 day incident, she was seeing and hearing things and wanted to leave the house. Per , "per  is gone". She recognizes faces and close family and friends. She no longer converses much, but when she does, it does not make any sense.  adds that she talks to people in the night times. "she sleeps all day". She is able to feed herself, but needs assistance with dressing/bathing. \par \par SHe is on both donepezil 10 mg QD and memantine 10 mg BID- no necessary improvement noticed on these medications or worsening confusion. She is also on aspirin therapy. She is not on anticoagulation. No hx of bradycardia. \par \par Daughter adds that she has gotten slower since her stroke and shuffles more, however she believes she may have shuffled in the past as well.  also notices a mild tremor of hands.\par NO known dementia in family. Sister has hx of strokes and aneurysm. \par \par Labs reviewed from 1/2022:-\par B12 and folate normal\par Ferritin normal. LFT normal. Iron and TBC normal. \par TSH 0.20 [L], Hba1c normal. Vitamin D normal (42.3)

## 2023-05-04 ENCOUNTER — RX RENEWAL (OUTPATIENT)
Age: 83
End: 2023-05-04

## 2023-06-21 ENCOUNTER — RX RENEWAL (OUTPATIENT)
Age: 83
End: 2023-06-21

## 2023-09-01 ENCOUNTER — RX RENEWAL (OUTPATIENT)
Age: 83
End: 2023-09-01

## 2023-11-01 ENCOUNTER — NON-APPOINTMENT (OUTPATIENT)
Age: 83
End: 2023-11-01

## 2023-11-02 ENCOUNTER — APPOINTMENT (OUTPATIENT)
Dept: NEUROLOGY | Facility: CLINIC | Age: 83
End: 2023-11-02
Payer: MEDICARE

## 2023-11-02 VITALS
DIASTOLIC BLOOD PRESSURE: 82 MMHG | HEIGHT: 57 IN | HEART RATE: 64 BPM | SYSTOLIC BLOOD PRESSURE: 128 MMHG | WEIGHT: 108 LBS | BODY MASS INDEX: 23.3 KG/M2

## 2023-11-02 DIAGNOSIS — G20.C PARKINSONISM, UNSPECIFIED: ICD-10-CM

## 2023-11-02 DIAGNOSIS — F01.50 VASCULAR DEMENTIA W/OUT BEHAVIORAL DISTURBANCE: ICD-10-CM

## 2023-11-02 PROCEDURE — 99214 OFFICE O/P EST MOD 30 MIN: CPT

## 2023-11-02 RX ORDER — MEMANTINE HYDROCHLORIDE 10 MG/1
10 TABLET, FILM COATED ORAL TWICE DAILY
Qty: 180 | Refills: 1 | Status: DISCONTINUED | COMMUNITY
Start: 2023-06-21 | End: 2023-11-02

## 2023-11-28 ENCOUNTER — APPOINTMENT (OUTPATIENT)
Dept: INTERNAL MEDICINE | Facility: CLINIC | Age: 83
End: 2023-11-28
Payer: MEDICARE

## 2023-11-28 ENCOUNTER — RX RENEWAL (OUTPATIENT)
Age: 83
End: 2023-11-28

## 2023-11-28 VITALS
WEIGHT: 106 LBS | HEART RATE: 60 BPM | BODY MASS INDEX: 22.87 KG/M2 | HEIGHT: 57 IN | DIASTOLIC BLOOD PRESSURE: 87 MMHG | SYSTOLIC BLOOD PRESSURE: 156 MMHG

## 2023-11-28 DIAGNOSIS — I10 ESSENTIAL (PRIMARY) HYPERTENSION: ICD-10-CM

## 2023-11-28 DIAGNOSIS — Z00.00 ENCOUNTER FOR GENERAL ADULT MEDICAL EXAMINATION W/OUT ABNORMAL FINDINGS: ICD-10-CM

## 2023-11-28 DIAGNOSIS — Z86.73 PERSONAL HISTORY OF TRANSIENT ISCHEMIC ATTACK (TIA), AND CEREBRAL INFARCTION W/OUT RESIDUAL DEFICITS: ICD-10-CM

## 2023-11-28 DIAGNOSIS — I67.1 CEREBRAL ANEURYSM, NONRUPTURED: ICD-10-CM

## 2023-11-28 DIAGNOSIS — E03.9 HYPOTHYROIDISM, UNSPECIFIED: ICD-10-CM

## 2023-11-28 DIAGNOSIS — R26.81 UNSTEADINESS ON FEET: ICD-10-CM

## 2023-11-28 DIAGNOSIS — E78.5 HYPERLIPIDEMIA, UNSPECIFIED: ICD-10-CM

## 2023-11-28 PROCEDURE — 36415 COLL VENOUS BLD VENIPUNCTURE: CPT

## 2023-11-28 PROCEDURE — G0439: CPT

## 2023-11-29 PROBLEM — I67.1 CEREBRAL ANEURYSM: Status: ACTIVE | Noted: 2022-10-10

## 2023-11-29 PROBLEM — E03.9 HYPOTHYROIDISM, UNSPECIFIED TYPE: Status: ACTIVE | Noted: 2022-01-26

## 2023-11-29 PROBLEM — E78.5 HYPERLIPIDEMIA: Status: ACTIVE | Noted: 2022-03-09

## 2023-11-29 PROBLEM — Z00.00 ENCOUNTER FOR PREVENTIVE HEALTH EXAMINATION: Status: ACTIVE | Noted: 2022-01-24

## 2023-11-29 PROBLEM — R26.81 UNSTEADY GAIT: Status: ACTIVE | Noted: 2022-01-26

## 2023-11-29 PROBLEM — I10 HYPERTENSION, UNSPECIFIED TYPE: Status: ACTIVE | Noted: 2022-02-09

## 2023-11-29 PROBLEM — Z86.73 HISTORY OF CVA (CEREBROVASCULAR ACCIDENT): Status: ACTIVE | Noted: 2022-01-26

## 2023-11-29 LAB
ALBUMIN SERPL ELPH-MCNC: 4.1 G/DL
ALP BLD-CCNC: 173 U/L
ALT SERPL-CCNC: 18 U/L
ANION GAP SERPL CALC-SCNC: 12 MMOL/L
APPEARANCE: CLEAR
AST SERPL-CCNC: 19 U/L
BACTERIA: NEGATIVE /HPF
BASOPHILS # BLD AUTO: 0.01 K/UL
BASOPHILS NFR BLD AUTO: 0.1 %
BILIRUB DIRECT SERPL-MCNC: 0.1 MG/DL
BILIRUB INDIRECT SERPL-MCNC: 0.2 MG/DL
BILIRUB SERPL-MCNC: 0.4 MG/DL
BILIRUBIN URINE: NEGATIVE
BLOOD URINE: ABNORMAL
BUN SERPL-MCNC: 16 MG/DL
CALCIUM OXALATE CRYSTALS: PRESENT
CALCIUM SERPL-MCNC: 8.9 MG/DL
CAST: 0 /LPF
CHLORIDE SERPL-SCNC: 106 MMOL/L
CHOLEST SERPL-MCNC: 120 MG/DL
CO2 SERPL-SCNC: 26 MMOL/L
COLOR: YELLOW
CREAT SERPL-MCNC: 0.64 MG/DL
EGFR: 88 ML/MIN/1.73M2
EOSINOPHIL # BLD AUTO: 0.07 K/UL
EOSINOPHIL NFR BLD AUTO: 0.9 %
EPITHELIAL CELLS: 4 /HPF
ESTIMATED AVERAGE GLUCOSE: 111 MG/DL
FERRITIN SERPL-MCNC: 23 NG/ML
FOLATE SERPL-MCNC: 7.4 NG/ML
GLUCOSE QUALITATIVE U: NEGATIVE MG/DL
GLUCOSE SERPL-MCNC: 105 MG/DL
HBA1C MFR BLD HPLC: 5.5 %
HCT VFR BLD CALC: 37.3 %
HDLC SERPL-MCNC: 59 MG/DL
HGB BLD-MCNC: 11.9 G/DL
IMM GRANULOCYTES NFR BLD AUTO: 0.3 %
IRON SATN MFR SERPL: 13 %
IRON SERPL-MCNC: 37 UG/DL
KETONES URINE: NEGATIVE MG/DL
LDLC SERPL CALC-MCNC: 37 MG/DL
LEUKOCYTE ESTERASE URINE: ABNORMAL
LYMPHOCYTES # BLD AUTO: 2.09 K/UL
LYMPHOCYTES NFR BLD AUTO: 26.7 %
MAN DIFF?: NORMAL
MCHC RBC-ENTMCNC: 29.6 PG
MCHC RBC-ENTMCNC: 31.9 GM/DL
MCV RBC AUTO: 92.8 FL
MICROSCOPIC-UA: NORMAL
MONOCYTES # BLD AUTO: 0.49 K/UL
MONOCYTES NFR BLD AUTO: 6.3 %
MUCUS: PRESENT
NEUTROPHILS # BLD AUTO: 5.16 K/UL
NEUTROPHILS NFR BLD AUTO: 65.7 %
NITRITE URINE: NEGATIVE
NONHDLC SERPL-MCNC: 62 MG/DL
PH URINE: 6
PLATELET # BLD AUTO: 236 K/UL
POTASSIUM SERPL-SCNC: 4.2 MMOL/L
PROT SERPL-MCNC: 6.5 G/DL
PROTEIN URINE: NEGATIVE MG/DL
RBC # BLD: 4.02 M/UL
RBC # FLD: 12.6 %
RED BLOOD CELLS URINE: 6 /HPF
REVIEW: NORMAL
SODIUM SERPL-SCNC: 144 MMOL/L
SPECIFIC GRAVITY URINE: 1.02
T3FREE SERPL-MCNC: 2.87 PG/ML
T4 FREE SERPL-MCNC: 1.5 NG/DL
TIBC SERPL-MCNC: 295 UG/DL
TRIGL SERPL-MCNC: 152 MG/DL
TSH SERPL-ACNC: 0.01 UIU/ML
UIBC SERPL-MCNC: 257 UG/DL
UROBILINOGEN URINE: 1 MG/DL
VIT B12 SERPL-MCNC: 915 PG/ML
WBC # FLD AUTO: 7.84 K/UL
WHITE BLOOD CELLS URINE: 7 /HPF

## 2024-02-28 ENCOUNTER — RX RENEWAL (OUTPATIENT)
Age: 84
End: 2024-02-28

## 2024-04-24 RX ORDER — METOPROLOL TARTRATE 50 MG/1
50 TABLET, FILM COATED ORAL
Qty: 180 | Refills: 0 | Status: ACTIVE | COMMUNITY
Start: 2023-01-30 | End: 1900-01-01

## 2024-04-24 RX ORDER — ATORVASTATIN CALCIUM 20 MG/1
20 TABLET, FILM COATED ORAL
Qty: 90 | Refills: 3 | Status: ACTIVE | COMMUNITY
Start: 2023-06-21 | End: 1900-01-01

## 2024-04-24 RX ORDER — LEVOTHYROXINE SODIUM 0.09 MG/1
88 TABLET ORAL
Qty: 90 | Refills: 2 | Status: ACTIVE | COMMUNITY
Start: 1900-01-01 | End: 1900-01-01

## 2024-04-24 RX ORDER — ASPIRIN 81 MG/1
81 TABLET, COATED ORAL
Qty: 90 | Refills: 0 | Status: ACTIVE | COMMUNITY
Start: 2024-04-24 | End: 1900-01-01

## 2024-04-24 RX ORDER — LISINOPRIL 20 MG/1
20 TABLET ORAL
Qty: 90 | Refills: 2 | Status: ACTIVE | COMMUNITY
Start: 2022-11-10 | End: 1900-01-01

## 2024-04-24 RX ORDER — MIRTAZAPINE 15 MG/1
15 TABLET, FILM COATED ORAL
Qty: 90 | Refills: 2 | Status: ACTIVE | COMMUNITY
Start: 1900-01-01 | End: 1900-01-01

## 2024-05-03 ENCOUNTER — APPOINTMENT (OUTPATIENT)
Dept: NEUROLOGY | Facility: CLINIC | Age: 84
End: 2024-05-03

## 2024-06-03 RX ORDER — WALKER
EACH MISCELLANEOUS
Qty: 1 | Refills: 0 | Status: ACTIVE | OUTPATIENT
Start: 2024-05-30

## 2024-09-16 ENCOUNTER — APPOINTMENT (OUTPATIENT)
Dept: INTERNAL MEDICINE | Facility: CLINIC | Age: 84
End: 2024-09-16

## 2024-10-21 ENCOUNTER — APPOINTMENT (OUTPATIENT)
Dept: INTERNAL MEDICINE | Facility: CLINIC | Age: 84
End: 2024-10-21

## 2024-10-23 RX ORDER — ASPIRIN ENTERIC COATED TABLETS 81 MG 81 MG/1
81 TABLET, DELAYED RELEASE ORAL
Qty: 90 | Refills: 3 | Status: ACTIVE | COMMUNITY
Start: 2024-10-22 | End: 1900-01-01

## 2024-10-28 ENCOUNTER — APPOINTMENT (OUTPATIENT)
Dept: NEUROLOGY | Facility: CLINIC | Age: 84
End: 2024-10-28

## 2024-12-17 ENCOUNTER — APPOINTMENT (OUTPATIENT)
Dept: INTERNAL MEDICINE | Facility: CLINIC | Age: 84
End: 2024-12-17
Payer: MEDICARE

## 2024-12-17 ENCOUNTER — NON-APPOINTMENT (OUTPATIENT)
Age: 84
End: 2024-12-17

## 2024-12-17 VITALS
DIASTOLIC BLOOD PRESSURE: 71 MMHG | BODY MASS INDEX: 26.75 KG/M2 | RESPIRATION RATE: 16 BRPM | SYSTOLIC BLOOD PRESSURE: 172 MMHG | HEART RATE: 82 BPM | WEIGHT: 124 LBS | OXYGEN SATURATION: 96 % | HEIGHT: 57 IN

## 2024-12-17 DIAGNOSIS — R94.31 ABNORMAL ELECTROCARDIOGRAM [ECG] [EKG]: ICD-10-CM

## 2024-12-17 DIAGNOSIS — I10 ESSENTIAL (PRIMARY) HYPERTENSION: ICD-10-CM

## 2024-12-17 DIAGNOSIS — E78.5 HYPERLIPIDEMIA, UNSPECIFIED: ICD-10-CM

## 2024-12-17 PROCEDURE — 36415 COLL VENOUS BLD VENIPUNCTURE: CPT

## 2024-12-17 PROCEDURE — G0439: CPT

## 2024-12-17 PROCEDURE — 93000 ELECTROCARDIOGRAM COMPLETE: CPT

## 2024-12-19 DIAGNOSIS — Z00.00 ENCOUNTER FOR GENERAL ADULT MEDICAL EXAMINATION W/OUT ABNORMAL FINDINGS: ICD-10-CM

## 2024-12-20 ENCOUNTER — CLINICAL ADVICE (OUTPATIENT)
Age: 84
End: 2024-12-20

## 2024-12-20 DIAGNOSIS — N39.0 URINARY TRACT INFECTION, SITE NOT SPECIFIED: ICD-10-CM

## 2024-12-20 DIAGNOSIS — G20.C PARKINSONISM, UNSPECIFIED: ICD-10-CM

## 2024-12-20 DIAGNOSIS — F01.50 VASCULAR DEMENTIA W/OUT BEHAVIORAL DISTURBANCE: ICD-10-CM

## 2024-12-20 LAB
25(OH)D3 SERPL-MCNC: 23.3 NG/ML
ALBUMIN SERPL ELPH-MCNC: 4.2 G/DL
ALP BLD-CCNC: 147 U/L
ALT SERPL-CCNC: 12 U/L
ANION GAP SERPL CALC-SCNC: 15 MMOL/L
APPEARANCE: CLEAR
AST SERPL-CCNC: 15 U/L
BACTERIA: ABNORMAL /HPF
BASOPHILS # BLD AUTO: 0.02 K/UL
BASOPHILS NFR BLD AUTO: 0.2 %
BILIRUB DIRECT SERPL-MCNC: 0.1 MG/DL
BILIRUB INDIRECT SERPL-MCNC: 0.3 MG/DL
BILIRUB SERPL-MCNC: 0.5 MG/DL
BILIRUBIN URINE: NEGATIVE
BLOOD URINE: ABNORMAL
BUN SERPL-MCNC: 15 MG/DL
CALCIUM SERPL-MCNC: 9.1 MG/DL
CAST: 0 /LPF
CHLORIDE SERPL-SCNC: 106 MMOL/L
CHOLEST SERPL-MCNC: 130 MG/DL
CO2 SERPL-SCNC: 24 MMOL/L
COLOR: YELLOW
CREAT SERPL-MCNC: 0.7 MG/DL
EGFR: 85 ML/MIN/1.73M2
EOSINOPHIL # BLD AUTO: 0.07 K/UL
EOSINOPHIL NFR BLD AUTO: 0.9 %
EPITHELIAL CELLS: 2 /HPF
ESTIMATED AVERAGE GLUCOSE: 111 MG/DL
FERRITIN SERPL-MCNC: 19 NG/ML
FOLATE SERPL-MCNC: 4.6 NG/ML
GLUCOSE QUALITATIVE U: NEGATIVE MG/DL
GLUCOSE SERPL-MCNC: 95 MG/DL
HBA1C MFR BLD HPLC: 5.5 %
HCT VFR BLD CALC: 37.1 %
HDLC SERPL-MCNC: 53 MG/DL
HGB BLD-MCNC: 11.6 G/DL
IMM GRANULOCYTES NFR BLD AUTO: 0.2 %
IRON SATN MFR SERPL: 11 %
IRON SERPL-MCNC: 36 UG/DL
KETONES URINE: NEGATIVE MG/DL
LDLC SERPL CALC-MCNC: 53 MG/DL
LEUKOCYTE ESTERASE URINE: ABNORMAL
LYMPHOCYTES # BLD AUTO: 2.09 K/UL
LYMPHOCYTES NFR BLD AUTO: 25.9 %
MAN DIFF?: NORMAL
MCHC RBC-ENTMCNC: 28.9 PG
MCHC RBC-ENTMCNC: 31.3 G/DL
MCV RBC AUTO: 92.3 FL
MICROSCOPIC-UA: NORMAL
MONOCYTES # BLD AUTO: 0.56 K/UL
MONOCYTES NFR BLD AUTO: 6.9 %
NEUTROPHILS # BLD AUTO: 5.31 K/UL
NEUTROPHILS NFR BLD AUTO: 65.9 %
NITRITE URINE: POSITIVE
NONHDLC SERPL-MCNC: 76 MG/DL
NT-PROBNP SERPL-MCNC: 59 PG/ML
PH URINE: 6.5
PLATELET # BLD AUTO: 231 K/UL
POTASSIUM SERPL-SCNC: 4.2 MMOL/L
PROT SERPL-MCNC: 7 G/DL
PROTEIN URINE: NEGATIVE MG/DL
RBC # BLD: 4.02 M/UL
RBC # FLD: 14.1 %
RED BLOOD CELLS URINE: 1 /HPF
SODIUM SERPL-SCNC: 145 MMOL/L
SPECIFIC GRAVITY URINE: 1.01
T3FREE SERPL-MCNC: 2.68 PG/ML
T4 FREE SERPL-MCNC: 1.5 NG/DL
TIBC SERPL-MCNC: 337 UG/DL
TRIGL SERPL-MCNC: 133 MG/DL
TSH SERPL-ACNC: 0.19 UIU/ML
UIBC SERPL-MCNC: 302 UG/DL
UROBILINOGEN URINE: 0.2 MG/DL
VIT B12 SERPL-MCNC: 618 PG/ML
WBC # FLD AUTO: 8.07 K/UL
WHITE BLOOD CELLS URINE: 44 /HPF

## 2024-12-23 RX ORDER — CEPHALEXIN 500 MG/1
500 CAPSULE ORAL
Qty: 14 | Refills: 0 | Status: ACTIVE | COMMUNITY
Start: 2024-12-20 | End: 1900-01-01

## 2025-01-06 ENCOUNTER — LABORATORY RESULT (OUTPATIENT)
Age: 85
End: 2025-01-06

## 2025-01-08 ENCOUNTER — CLINICAL ADVICE (OUTPATIENT)
Age: 85
End: 2025-01-08

## 2025-01-09 ENCOUNTER — CLINICAL ADVICE (OUTPATIENT)
Age: 85
End: 2025-01-09

## 2025-01-13 DIAGNOSIS — L60.0 INGROWING NAIL: ICD-10-CM

## 2025-01-13 DIAGNOSIS — Z00.00 ENCOUNTER FOR GENERAL ADULT MEDICAL EXAMINATION W/OUT ABNORMAL FINDINGS: ICD-10-CM
